# Patient Record
Sex: FEMALE | Race: WHITE | NOT HISPANIC OR LATINO | URBAN - METROPOLITAN AREA
[De-identification: names, ages, dates, MRNs, and addresses within clinical notes are randomized per-mention and may not be internally consistent; named-entity substitution may affect disease eponyms.]

---

## 2017-06-19 ENCOUNTER — COMMUNICATION - HEALTHEAST (OUTPATIENT)
Dept: SURGERY | Facility: CLINIC | Age: 47
End: 2017-06-19

## 2017-07-31 ENCOUNTER — AMBULATORY - HEALTHEAST (OUTPATIENT)
Dept: SURGERY | Facility: CLINIC | Age: 47
End: 2017-07-31

## 2017-07-31 ENCOUNTER — OFFICE VISIT - HEALTHEAST (OUTPATIENT)
Dept: SURGERY | Facility: CLINIC | Age: 47
End: 2017-07-31

## 2017-07-31 ENCOUNTER — COMMUNICATION - HEALTHEAST (OUTPATIENT)
Dept: SURGERY | Facility: CLINIC | Age: 47
End: 2017-07-31

## 2017-07-31 ENCOUNTER — AMBULATORY - HEALTHEAST (OUTPATIENT)
Dept: LAB | Facility: CLINIC | Age: 47
End: 2017-07-31

## 2017-07-31 DIAGNOSIS — E66.811 OBESITY (BMI 30.0-34.9): ICD-10-CM

## 2017-07-31 DIAGNOSIS — E78.5 DYSLIPIDEMIA: ICD-10-CM

## 2017-07-31 DIAGNOSIS — R53.83 FATIGUE: ICD-10-CM

## 2017-07-31 DIAGNOSIS — E53.8 VITAMIN B12 DEFICIENCY: ICD-10-CM

## 2017-07-31 DIAGNOSIS — R79.89 ELEVATED FERRITIN: ICD-10-CM

## 2017-07-31 DIAGNOSIS — N95.1 PERIMENOPAUSAL: ICD-10-CM

## 2017-07-31 DIAGNOSIS — R32 URINARY INCONTINENCE: ICD-10-CM

## 2017-07-31 LAB
FASTING STATUS PATIENT QL REPORTED: NO
HBA1C MFR BLD: 5.1 % (ref 4.2–6.1)
HDLC SERPL-MCNC: 59 MG/DL
LDLC SERPL CALC-MCNC: 168 MG/DL

## 2017-07-31 RX ORDER — NAPROXEN SODIUM 220 MG
220 TABLET ORAL PRN
Status: SHIPPED | COMMUNITY
Start: 2017-07-31

## 2017-07-31 ASSESSMENT — MIFFLIN-ST. JEOR: SCORE: 1438.23

## 2017-08-01 ENCOUNTER — AMBULATORY - HEALTHEAST (OUTPATIENT)
Dept: SURGERY | Facility: CLINIC | Age: 47
End: 2017-08-01

## 2017-08-01 DIAGNOSIS — R79.82 ELEVATED C-REACTIVE PROTEIN (CRP): ICD-10-CM

## 2017-09-27 ENCOUNTER — OFFICE VISIT - HEALTHEAST (OUTPATIENT)
Dept: SURGERY | Facility: CLINIC | Age: 47
End: 2017-09-27

## 2017-09-27 DIAGNOSIS — E66.9 OBESITY (BMI 30-39.9): ICD-10-CM

## 2017-09-27 DIAGNOSIS — Z71.3 DIETARY COUNSELING: ICD-10-CM

## 2017-09-27 ASSESSMENT — MIFFLIN-ST. JEOR: SCORE: 1404.21

## 2017-10-24 ENCOUNTER — OFFICE VISIT - HEALTHEAST (OUTPATIENT)
Dept: SURGERY | Facility: CLINIC | Age: 47
End: 2017-10-24

## 2017-10-24 DIAGNOSIS — E66.811 OBESITY (BMI 30.0-34.9): ICD-10-CM

## 2017-10-24 DIAGNOSIS — R79.82 ELEVATED C-REACTIVE PROTEIN (CRP): ICD-10-CM

## 2017-10-24 ASSESSMENT — MIFFLIN-ST. JEOR: SCORE: 1383.8

## 2017-11-27 ENCOUNTER — OFFICE VISIT - HEALTHEAST (OUTPATIENT)
Dept: SURGERY | Facility: CLINIC | Age: 47
End: 2017-11-27

## 2017-11-27 DIAGNOSIS — Z71.3 DIETARY COUNSELING: ICD-10-CM

## 2017-11-27 DIAGNOSIS — E66.9 OBESITY (BMI 30-39.9): ICD-10-CM

## 2017-11-27 ASSESSMENT — MIFFLIN-ST. JEOR: SCORE: 1356.58

## 2018-01-10 ENCOUNTER — COMMUNICATION - HEALTHEAST (OUTPATIENT)
Dept: SURGERY | Facility: CLINIC | Age: 48
End: 2018-01-10

## 2018-01-10 ENCOUNTER — OFFICE VISIT - HEALTHEAST (OUTPATIENT)
Dept: SURGERY | Facility: CLINIC | Age: 48
End: 2018-01-10

## 2018-01-10 DIAGNOSIS — E66.811 OBESITY (BMI 30.0-34.9): ICD-10-CM

## 2018-01-10 ASSESSMENT — MIFFLIN-ST. JEOR: SCORE: 1358.85

## 2018-01-11 ENCOUNTER — COMMUNICATION - HEALTHEAST (OUTPATIENT)
Dept: SURGERY | Facility: CLINIC | Age: 48
End: 2018-01-11

## 2018-03-05 ENCOUNTER — OFFICE VISIT - HEALTHEAST (OUTPATIENT)
Dept: SURGERY | Facility: CLINIC | Age: 48
End: 2018-03-05

## 2018-03-05 DIAGNOSIS — Z71.3 DIETARY COUNSELING: ICD-10-CM

## 2018-03-05 DIAGNOSIS — E66.9 OBESITY (BMI 30-39.9): ICD-10-CM

## 2018-03-05 ASSESSMENT — MIFFLIN-ST. JEOR: SCORE: 1354.32

## 2018-08-24 ENCOUNTER — COMMUNICATION - HEALTHEAST (OUTPATIENT)
Dept: SURGERY | Facility: CLINIC | Age: 48
End: 2018-08-24

## 2018-08-27 ENCOUNTER — COMMUNICATION - HEALTHEAST (OUTPATIENT)
Dept: SURGERY | Facility: CLINIC | Age: 48
End: 2018-08-27

## 2018-10-11 ENCOUNTER — OFFICE VISIT - HEALTHEAST (OUTPATIENT)
Dept: SURGERY | Facility: CLINIC | Age: 48
End: 2018-10-11

## 2018-10-11 DIAGNOSIS — R79.82 ELEVATED C-REACTIVE PROTEIN (CRP): ICD-10-CM

## 2018-10-11 DIAGNOSIS — R79.89 ELEVATED FERRITIN: ICD-10-CM

## 2018-10-11 DIAGNOSIS — E78.00 ELEVATED LDL CHOLESTEROL LEVEL: ICD-10-CM

## 2018-10-11 DIAGNOSIS — E66.9 OBESITY: ICD-10-CM

## 2018-10-11 DIAGNOSIS — Z13.1 SCREENING FOR DIABETES MELLITUS: ICD-10-CM

## 2018-10-11 ASSESSMENT — MIFFLIN-ST. JEOR: SCORE: 1386.07

## 2018-10-12 ENCOUNTER — COMMUNICATION - HEALTHEAST (OUTPATIENT)
Dept: LAB | Facility: CLINIC | Age: 48
End: 2018-10-12

## 2018-10-23 ENCOUNTER — AMBULATORY - HEALTHEAST (OUTPATIENT)
Dept: LAB | Facility: CLINIC | Age: 48
End: 2018-10-23

## 2018-10-23 DIAGNOSIS — Z13.1 SCREENING FOR DIABETES MELLITUS: ICD-10-CM

## 2018-10-23 DIAGNOSIS — R79.89 ELEVATED FERRITIN: ICD-10-CM

## 2018-10-23 DIAGNOSIS — E66.9 OBESITY: ICD-10-CM

## 2018-10-23 DIAGNOSIS — R79.82 ELEVATED C-REACTIVE PROTEIN (CRP): ICD-10-CM

## 2018-10-23 DIAGNOSIS — E78.00 ELEVATED LDL CHOLESTEROL LEVEL: ICD-10-CM

## 2018-10-23 LAB
ALBUMIN SERPL-MCNC: 3.7 G/DL (ref 3.5–5)
ALP SERPL-CCNC: 45 U/L (ref 45–120)
ALT SERPL W P-5'-P-CCNC: 17 U/L (ref 0–45)
ANION GAP SERPL CALCULATED.3IONS-SCNC: 11 MMOL/L (ref 5–18)
AST SERPL W P-5'-P-CCNC: 16 U/L (ref 0–40)
BILIRUB SERPL-MCNC: 0.7 MG/DL (ref 0–1)
BUN SERPL-MCNC: 18 MG/DL (ref 8–22)
CALCIUM SERPL-MCNC: 9.2 MG/DL (ref 8.5–10.5)
CHLORIDE BLD-SCNC: 108 MMOL/L (ref 98–107)
CHOLEST SERPL-MCNC: 228 MG/DL
CO2 SERPL-SCNC: 22 MMOL/L (ref 22–31)
CREAT SERPL-MCNC: 0.77 MG/DL (ref 0.6–1.1)
CRP SERPL HS-MCNC: 7.2 MG/L (ref 0–3)
ERYTHROCYTE [DISTWIDTH] IN BLOOD BY AUTOMATED COUNT: 11.6 % (ref 11–14.5)
FASTING STATUS PATIENT QL REPORTED: ABNORMAL
FERRITIN SERPL-MCNC: 268 NG/ML (ref 10–130)
GFR SERPL CREATININE-BSD FRML MDRD: >60 ML/MIN/1.73M2
GLUCOSE BLD-MCNC: 97 MG/DL (ref 70–125)
HBA1C MFR BLD: 5.2 % (ref 3.5–6)
HCT VFR BLD AUTO: 37.6 % (ref 35–47)
HDLC SERPL-MCNC: 61 MG/DL
HGB BLD-MCNC: 12.9 G/DL (ref 12–16)
IRON SATN MFR SERPL: 39 % (ref 20–50)
IRON SERPL-MCNC: 140 UG/DL (ref 42–175)
LDLC SERPL CALC-MCNC: 139 MG/DL
MCH RBC QN AUTO: 30.1 PG (ref 27–34)
MCHC RBC AUTO-ENTMCNC: 34.3 G/DL (ref 32–36)
MCV RBC AUTO: 88 FL (ref 80–100)
PLATELET # BLD AUTO: 262 THOU/UL (ref 140–440)
PMV BLD AUTO: 7.4 FL (ref 7–10)
POTASSIUM BLD-SCNC: 4.3 MMOL/L (ref 3.5–5)
PROT SERPL-MCNC: 6.4 G/DL (ref 6–8)
PTH-INTACT SERPL-MCNC: 62 PG/ML (ref 10–86)
RBC # BLD AUTO: 4.28 MILL/UL (ref 3.8–5.4)
SODIUM SERPL-SCNC: 141 MMOL/L (ref 136–145)
TIBC SERPL-MCNC: 362 UG/DL (ref 313–563)
TRANSFERRIN SERPL-MCNC: 290 MG/DL (ref 212–360)
TRIGL SERPL-MCNC: 138 MG/DL
WBC: 5.4 THOU/UL (ref 4–11)

## 2018-10-24 ENCOUNTER — COMMUNICATION - HEALTHEAST (OUTPATIENT)
Dept: SURGERY | Facility: CLINIC | Age: 48
End: 2018-10-24

## 2019-04-17 ENCOUNTER — OFFICE VISIT - HEALTHEAST (OUTPATIENT)
Dept: SURGERY | Facility: CLINIC | Age: 49
End: 2019-04-17

## 2019-04-17 DIAGNOSIS — E66.9 OBESITY: ICD-10-CM

## 2019-04-17 DIAGNOSIS — R63.2 HYPERPHAGIA: ICD-10-CM

## 2019-04-17 RX ORDER — NORETHINDRONE ACETATE AND ETHINYL ESTRADIOL .03; 1.5 MG/1; MG/1
1 TABLET ORAL DAILY
Status: SHIPPED | COMMUNITY
Start: 2010-10-07

## 2019-04-17 ASSESSMENT — MIFFLIN-ST. JEOR: SCORE: 1372.46

## 2019-08-26 ENCOUNTER — OFFICE VISIT - HEALTHEAST (OUTPATIENT)
Dept: SURGERY | Facility: CLINIC | Age: 49
End: 2019-08-26

## 2019-08-26 DIAGNOSIS — E66.9 OBESITY (BMI 30-39.9): ICD-10-CM

## 2019-08-26 DIAGNOSIS — Z71.3 DIETARY COUNSELING: ICD-10-CM

## 2019-08-26 ASSESSMENT — MIFFLIN-ST. JEOR: SCORE: 1386.07

## 2019-10-16 ENCOUNTER — OFFICE VISIT - HEALTHEAST (OUTPATIENT)
Dept: SURGERY | Facility: CLINIC | Age: 49
End: 2019-10-16

## 2019-10-16 DIAGNOSIS — E66.811 OBESITY (BMI 30.0-34.9): ICD-10-CM

## 2019-10-16 DIAGNOSIS — E66.9 OBESITY: ICD-10-CM

## 2019-10-16 RX ORDER — PHENTERMINE HYDROCHLORIDE 37.5 MG/1
TABLET ORAL
Qty: 90 TABLET | Refills: 1 | Status: SHIPPED | OUTPATIENT
Start: 2019-10-16

## 2019-10-16 ASSESSMENT — MIFFLIN-ST. JEOR: SCORE: 1408.75

## 2019-11-12 ENCOUNTER — OFFICE VISIT - HEALTHEAST (OUTPATIENT)
Dept: SURGERY | Facility: CLINIC | Age: 49
End: 2019-11-12

## 2019-11-12 DIAGNOSIS — Z71.3 NUTRITIONAL COUNSELING: ICD-10-CM

## 2019-11-12 DIAGNOSIS — E66.811 OBESITY, CLASS I, BMI 30.0-34.9 (SEE ACTUAL BMI): ICD-10-CM

## 2019-11-12 ASSESSMENT — MIFFLIN-ST. JEOR: SCORE: 1420.54

## 2021-03-24 ENCOUNTER — AMBULATORY - HEALTHEAST (OUTPATIENT)
Dept: NURSING | Facility: CLINIC | Age: 51
End: 2021-03-24

## 2021-04-14 ENCOUNTER — AMBULATORY - HEALTHEAST (OUTPATIENT)
Dept: NURSING | Facility: CLINIC | Age: 51
End: 2021-04-14

## 2021-05-27 NOTE — PROGRESS NOTES
Here for f/u MWM, needs a refill of her phentermine.  See flowsheet.     Clarita Gonzalez RN, [facility]  P [phone]  F [phone]

## 2021-05-27 NOTE — PATIENT INSTRUCTIONS - HE
[facility]ric Basics    Remember to:    -Eat 3 meals a day (not 2, not 5) Chew your food well/SLOW down  -Eat your protein first  -Be a water drinker/Minize liquid calories (no regular pop, no juice) skim or 1% milk OK  -Sleep 7-8 hours each night. Address sleep if problematic  -Stress management is important. Address if problematic  -Move-8000 steps daily Muscle: maintain your muscle mass (strength training 2X/wk)  -Wheat, not white (bread, pasta, crackers, roni, bagels, tortillas, rice)  -Limit restaurant, cafeteria, take out, drive through to 2 times per week or less  -Minimize caffeine, alcohol, and night-time snacking  -Consider keeping a food diary (i.e. My Fitness Pal, Lose It, or other food tracker)  -Follow up with the dietitian      **Some lean proteins: chicken, turkey, tuna, salmon, crab, fish, shrimp, scallops, lobster, lean cuts of beef and pork, luncheon meats, veggie burgers, beans (black, lima, garbanzo, boogie, kidney, refried), chile, cottage cheese, string cheese, other cheese, eggs, tofu, peanut butter, nuts, vegan crumbles, greek yogurt

## 2021-05-27 NOTE — PROGRESS NOTES
"Bariatric Follow-up    48 y.o.  female BMI:Body mass index is 31.18 kg/m .    Weight:   Wt Readings from Last 1 Encounters:   04/17/19 176 lb (79.8 kg)    pounds  Height: 5' 3\" (1.6 m) (4/17/2019  8:44 AM)  Initial Weight: 190.5 lbs (4/17/2019  8:44 AM)  Weight: 176 lb (79.8 kg) (4/17/2019  8:44 AM)  Weight loss from initial: 14.5 (4/17/2019  8:44 AM)  % Weight loss: 7.61 % (4/17/2019  8:44 AM)  BMI (Calculated): 31.2 (4/17/2019  8:44 AM)  SpO2: 100 % (10/11/2018  8:00 AM)      Comorbidities:  Patient Active Problem List   Diagnosis     Elevated LDL cholesterol level     Obesity (BMI 30.0-34.9)     Knee pain     Low back pain     Foot pain     Headache     Perimenopausal     Fatigue     Arthritis     Menstrual disorder     Urinary incontinence     Skin tags, multiple acquired     Vitamin B12 deficiency     Elevated ferritin level     Elevated C-reactive protein (CRP)     FHx: rheumatoid arthritis       Interim: Maintaining a 14# weight loss. BP, chol, sugars look good. Did the whole 30 diet    Plan: Refill phentermine. Dietitian prn protect sleep and stress management   -We reviewed her medications and whether associated with weight gain.    We discussed [facility] Bariatric Basics including:  -eating 3 meals daily  -eating protein first  -eating slowly, chewing food well  -avoiding/limiting calorie containing beverages  -choosing wheat, not white with breads, crackers, pastas, roni, bagels, tortillas, rice  -limiting restaurant or cafeteria eating to twice a week or less  -We discussed the importance of restorative sleep and stress management in maintaining a healthy weight.  -We discussed insulin resistance and glycemic index as it relates to appetite and weight control  -We discussed the National Weight Control Registry healthy weight maintenance strategies and ways to optimize metabolism.  -We discussed the importance of physical activity including cardiovascular and strength training in maintaining a healthier " weight and explored viable options.    Most recent labs:  Lab Results   Component Value Date    WBC 5.4 10/23/2018    HGB 12.9 10/23/2018    HCT 37.6 10/23/2018    MCV 88 10/23/2018     10/23/2018     Lab Results   Component Value Date    CHOL 228 (H) 10/23/2018     Lab Results   Component Value Date    HDL 61 10/23/2018     Lab Results   Component Value Date    LDLCALC 139 (H) 10/23/2018     Lab Results   Component Value Date    TRIG 138 10/23/2018     No components found for: CHOLHDL  Lab Results   Component Value Date    ALT 17 10/23/2018    AST 16 10/23/2018    ALKPHOS 45 10/23/2018    BILITOT 0.7 10/23/2018     Lab Results   Component Value Date    HGBA1C 5.2 10/23/2018     Lab Results   Component Value Date    BTYDYJRE26 174 (L) 07/31/2017     No results found for: FZYXPZXY54SE  Lab Results   Component Value Date    FERRITIN 268 (H) 10/23/2018     Lab Results   Component Value Date    PTH 62 10/23/2018     No results found for: 97250  No results found for: 7597  Lab Results   Component Value Date    TSH 1.06 07/31/2017     No results found for: TESTOSTERONE    DIETARY HISTORY  Meals Per Day: 3  Eating Protein First?: yes  Food Diary: B:egg, cheese, anaya or meat L:salad with protein, nourish bowls,  D:mushrooms and italian sausage and 3 pasta  Snacks Per Day: 0-1  Typical Snack: cheese, nuts, protein cracker  Fluid Intake: intentional  Portion Control: improved  Calorie Containing Beverages: no except a splash  Alcohol per week:  Less now maybe 1-2/wk  Typical Protein Food Choices: variety  Choosing Whole Grains: yes  Grocery Shopping is done by: herself  Food Preparation is done by: herself  Meals at Restaurant/Cafeteria/Take Out Per Week: 0-1  Eating at the Table: yes  TV is Off During Meals: yes    Positive Changes Since Last Visit: maintaining weight. Eating lean  Struggling With: exercise    Knowledgeable in Reading Food Labels: yes  Getting Adequate Protein: herson  Sleeping 7-8 hours/day awakening  "at 2-3am  Stress management high work stress    PHYSICAL ACTIVITY PATTERNS:  Cardiovascular: walks the dog 2X/d and swimming with Roxy  Strength Training: swimming    REVIEW OF SYSTEMS  GENERAL/CONSTITUTIONAL:  Fatigue: improved  CARDIOVASCULAR:  Chest Pain with Exertion: no  PULMONARY:  Dyspnea on exertion: no  CPAP Use: no  Tobacco Use: no  Asthma Controlled: NA  GASTROINTESTINAL:  GERD/Heartburn: no  Gallbladder:   UROLOGIC:  Urinary Symptoms: nocturia  NEUROLOGIC:  Headaches: yes, sleep and stress  Paresthesias: no  PSYCHIATRIC:  Moods: OK  MUSCULOSKELETAL/RHEUMATOLOGIC  Arthralgias: no  Myalgias: heel improved  ENDOCRINE:  Monitoring Blood Sugars: no  Sugars Well Controlled: yes  DERMATOLOGIC:  Rashes: no    PHYSICAL EXAM:  Vitals: /75   Pulse 72   Resp 16   Ht 5' 3\" (1.6 m)   Wt 176 lb (79.8 kg)   BMI 31.18 kg/m    Height: 5' 3\" (1.6 m) (4/17/2019  8:44 AM)  Initial Weight: 190.5 lbs (4/17/2019  8:44 AM)  Weight: 176 lb (79.8 kg) (4/17/2019  8:44 AM)  Weight loss from initial: 14.5 (4/17/2019  8:44 AM)  % Weight loss: 7.61 % (4/17/2019  8:44 AM)  BMI (Calculated): 31.2 (4/17/2019  8:44 AM)  SpO2: 100 % (10/11/2018  8:00 AM)      GEN: Pleasant, well groomed, in no acute distress  EYES: EOMI,  ENT: airway patent  NECK: no carotid bruits, no anterior/supraclavicular lymphadenopathy, thyroid normal   HEART: Rhythm regular, rate regular, no murmur   LUNGS: Clear  ABDOMEN: soft, non-tender, obese, no rashes   VASCULAR: no  lower extremity edema  MUSCULOSKELETAL:  muscle mass OK for age  SKIN:  no color changes of venous stasis, no ulcerations    Time spent with patients 30 minutes, >50% in counseling and coordination of care.        "

## 2021-05-31 VITALS — HEIGHT: 63 IN | WEIGHT: 190.5 LBS | BODY MASS INDEX: 33.75 KG/M2

## 2021-05-31 VITALS — HEIGHT: 63 IN | WEIGHT: 172.5 LBS | BODY MASS INDEX: 30.56 KG/M2

## 2021-05-31 VITALS — WEIGHT: 178.5 LBS | HEIGHT: 63 IN | BODY MASS INDEX: 31.63 KG/M2

## 2021-05-31 VITALS — WEIGHT: 173 LBS | BODY MASS INDEX: 30.65 KG/M2 | HEIGHT: 63 IN

## 2021-05-31 VITALS — HEIGHT: 63 IN | BODY MASS INDEX: 32.43 KG/M2 | WEIGHT: 183 LBS

## 2021-05-31 NOTE — PROGRESS NOTES
Medical  Weight Loss Follow-Up Diet Evaluation  Assessment:  Gunjan is presenting today for a follow up weight management nutrition consultation. Pt has had an initial appointment with Dr. Gee  Weight loss medication: Phentermine. 1 full tab daily  Pt's Initial Weight: 190.5 lbs  Weight: 179 lb (81.2 kg)  Weight loss from initial: 11.5  % Weight loss: 6.04 %    BMI: Body mass index is 31.71 kg/m .  IBW: 115 lbs    Estimated RMR ([place]-St Jeor equation): 1462   Recommended Protein Intake:  grams of protein/day  Patient Active Problem List:  Patient Active Problem List   Diagnosis     Elevated LDL cholesterol level     Obesity (BMI 30.0-34.9)     Knee pain     Low back pain     Foot pain     Headache     Perimenopausal     Fatigue     Arthritis     Menstrual disorder     Urinary incontinence     Skin tags, multiple acquired     Vitamin B12 deficiency     Elevated ferritin level     Elevated C-reactive protein (CRP)     FHx: rheumatoid arthritis     Progress on goals from last visit: Pt has not been in for over a year; pt would like to get back on track. She stated over the summer she wasn't paying as close of attention. She did complete the whole 30 - answered questions throughout. Pt wanted to talk about balanced nutrition for her daughter and healthy snacking. She is starting a running group with her daughter and plans to run a 5k at the end of the program.  ++pt has successfully eliminated diet coke and state cravings are lower now that she is not drinking    Nutrition Diagnosis:    (NI-1.3)Excessive energy intake related to Not ready for diet/lifestyle change as evidenced by Intake of high caloric density foods at meals and/or snacks; large portion; frequent grazing; Estimated intake that exceeds estimated daily energy intake; Binge eating patterns; Frequent  fast food or restaurant intake; and BMI 31.71    (NC-3.3.5) Obese, BMI ?30 related to physical inactivity as evidenced by Infrequent, low-duration  and or low intensity physical activity; and Large amounts of sedentary activities; no structured physical activity regimen      Intervention:  1. Recommend calorie/nutrient modification    Implementation:  1. Reviewed progress with previous goals  2. Reviewed meal planning  3. Reviewed protein sources and building a balanced plate  ++meals and snack idea ([place]) given    Monitoring/Evaluation:    Goals:  1. Pt would like to start the 5k program and strength training programs given to her  Follow up:  Pt will follow up in 2 month(s) with bariatrician  Time spent with patient: 30 minutes  Janny Solomon RD     ABN signed: Yes

## 2021-06-01 VITALS — HEIGHT: 63 IN | WEIGHT: 172 LBS | BODY MASS INDEX: 30.48 KG/M2

## 2021-06-02 VITALS — HEIGHT: 63 IN | BODY MASS INDEX: 31.71 KG/M2 | WEIGHT: 179 LBS

## 2021-06-02 NOTE — PROGRESS NOTES
"Bariatric Follow-up    49 y.o.  female BMI:Body mass index is 32.59 kg/m .    Weight:   Wt Readings from Last 1 Encounters:   10/16/19 184 lb (83.5 kg)    pounds  Height: 5' 3\" (1.6 m) (10/16/2019  7:54 AM)  Initial Weight: 190.5 lbs (8/26/2019  8:00 AM)  Weight: 184 lb (83.5 kg) (10/16/2019  7:54 AM)  Weight loss from initial: 11.5 (8/26/2019  8:00 AM)  % Weight loss: 6.04 % (8/26/2019  8:00 AM)  BMI (Calculated): 32.6 (10/16/2019  7:54 AM)  SpO2: 100 % (10/16/2019  7:54 AM)      Comorbidities:  Patient Active Problem List   Diagnosis     Elevated LDL cholesterol level     Obesity (BMI 30.0-34.9)     Knee pain     Low back pain     Foot pain     Headache     Perimenopausal     Fatigue     Arthritis     Menstrual disorder     Urinary incontinence     Skin tags, multiple acquired     Vitamin B12 deficiency     Elevated ferritin level     Elevated C-reactive protein (CRP)     FHx: rheumatoid arthritis     Interim: Traveling a lot. Saw [place] in August    Plan: consistent strength training, refill phentermine. Joanna.  -We reviewed her medications and whether associated with weight gain.    We discussed [facility] Bariatric Basics including:  -eating 3 meals daily  -eating protein first  -eating slowly, chewing food well  -avoiding/limiting calorie containing beverages  -choosing wheat, not white with breads, crackers, pastas, roni, bagels, tortillas, rice  -limiting restaurant or cafeteria eating to twice a week or less  -We discussed the importance of restorative sleep and stress management in maintaining a healthy weight.  -We discussed insulin resistance and glycemic index as it relates to appetite and weight control  -We discussed the National Weight Control Registry healthy weight maintenance strategies and ways to optimize metabolism.  -We discussed the importance of physical activity including cardiovascular and strength training in maintaining a healthier weight and explored viable options.    Most recent " "labs:  Lab Results   Component Value Date    WBC 5.4 10/23/2018    HGB 12.9 10/23/2018    HCT 37.6 10/23/2018    MCV 88 10/23/2018     10/23/2018     Lab Results   Component Value Date    CHOL 228 (H) 10/23/2018     Lab Results   Component Value Date    HDL 61 10/23/2018     Lab Results   Component Value Date    LDLCALC 139 (H) 10/23/2018     Lab Results   Component Value Date    TRIG 138 10/23/2018     Lab Results   Component Value Date    ALT 17 10/23/2018    AST 16 10/23/2018    ALKPHOS 45 10/23/2018    BILITOT 0.7 10/23/2018     Lab Results   Component Value Date    HGBA1C 5.2 10/23/2018     Lab Results   Component Value Date    IQUMSHBB52 174 (L) 07/31/2017     Lab Results   Component Value Date    FERRITIN 268 (H) 10/23/2018     Lab Results   Component Value Date    PTH 62 10/23/2018     Lab Results   Component Value Date    TSH 1.06 07/31/2017     DIETARY HISTORY  Meals Per Day: 3  Eating Protein First?: yes  Food Diary: B:egg or  L:salad with protein D:chile  Snacks Per Day: 0-1  Typical Snack: almonds  Fluid Intake: intentional  Portion Control: improved  Calorie Containing Beverages: no  Alcohol per week: 0-2  Typical Protein Food Choices: variety  Choosing Whole Grains: yes  Grocery Shopping is done by: herself  Food Preparation is done by: herself  Meals at Restaurant/Cafeteria/Take Out Per Week: 1-2  Eating at the Table: yes  TV is Off During Meals: yes    Positive Changes Since Last Visit: maintaining a 6# weight loss and metabolically healthy  Struggling With: work is stressful-daily calm asha used to help    Knowledgeable in Reading Food Labels: yes  Getting Adequate Protein: yes  Sleeping 7-8 hours/day wakes up at 3 am.  Stress management will get her calm asha    PHYSICAL ACTIVITY PATTERNS:  Cardiovascular: walks the dog 2-3X/day. Signed up to  \"girls on the run\"  Strength Training: push ups with girls on the run, was swimming,  Will get back to yoga, and walking the dog    REVIEW OF " "SYSTEMS  GENERAL/CONSTITUTIONAL:  Fatigue: on and off  CARDIOVASCULAR:  Chest Pain with Exertion: yes  PULMONARY:  Dyspnea on exertion: no  CPAP Use: no  Tobacco Use: no  Asthma Controlled: NA  GASTROINTESTINAL:  GERD/Heartburn: no  Gallbladder:   UROLOGIC:  Urinary Symptoms: no  NEUROLOGIC:  Headaches: some  Paresthesias: no  PSYCHIATRIC:  Moods: OK  MUSCULOSKELETAL/RHEUMATOLOGIC  Arthralgias: improved  Myalgias: improved  ENDOCRINE:  Monitoring Blood Sugars: no  Sugars Well Controlled: yes  DERMATOLOGIC:  Rashes: no    PHYSICAL EXAM:  Vitals: /74 (Patient Site: Right Arm, Patient Position: Sitting, Cuff Size: Adult Large)   Pulse 85   Ht 5' 3\" (1.6 m)   Wt 184 lb (83.5 kg)   SpO2 100%   Breastfeeding? No   BMI 32.59 kg/m    Height: 5' 3\" (1.6 m) (10/16/2019  7:54 AM)  Initial Weight: 190.5 lbs (8/26/2019  8:00 AM)  Weight: 184 lb (83.5 kg) (10/16/2019  7:54 AM)  Weight loss from initial: 11.5 (8/26/2019  8:00 AM)  % Weight loss: 6.04 % (8/26/2019  8:00 AM)  BMI (Calculated): 32.6 (10/16/2019  7:54 AM)  SpO2: 100 % (10/16/2019  7:54 AM)      GEN: Pleasant, well groomed, in no acute distress  EYES: EOMI,  ENT: airway patent  NECK: no carotid bruits, no anterior/supraclavicular lymphadenopathy, thyroid normal   HEART: Rhythm regular, rate regular, no murmur   LUNGS: Clear  ABDOMEN: soft, non-tender, obese, no rashes   VASCULAR: no  lower extremity edema  MUSCULOSKELETAL:  muscle mass OK  SKIN:  no color changes of venous stasis, no ulcerations    Time spent with patients 20 minutes, >50% in counseling and coordination of care.        "

## 2021-06-02 NOTE — PATIENT INSTRUCTIONS - HE
"WEIGHT MAINTENANCE STRATEGIES    According to the National Weight Control Registry there are several things that people who have lost weight and kept it off have in common. Some of them are...    1. 3 MEALS A DAY:  Make sure you are eating 3 meals each day.  No skipping meals.  80% of people who skip meals are overweight or obese.  Missing meals slows the metabolism, making it harder to maintain a healthy weight.    2. FOOD DIARY:  Much like keeping a ledger for your checkbook, keeping a food and exercise diary helps you \"keep track\" of the balance of energy (calories) in and energy out. It also helps you recognize potential unhealthy deviations from healthy patterns before they become habit. It's a nice way to monitor whether you are getting the protein, fiber, and other nutrients that your body needs.    3. FOLLOW-UP:  Studies show that those who follow up with their health professional regularly maintained their weight loss and those who are \"lost to follow-up \"are at risk for regain.  Moreover, it is essential to monitor vitamin levels with laboratory studies for life following gastric bypass surgery.     4.  HIGH FIBER/LOW FAT:  Lean sources of protein (skim milk, skinless, baked or broiled chicken breast, fish, etc.)  will help you meet your protein needs while fruits, vegetables, and whole grains will help you get the fiber that your body needs.  This is heart healthy eating and helps to keep calorie levels in balance.    5.  8,000 STEPS PER DAY:  This is a \"weight maintenance dose. \"  It is essential to get your steps in every day, 7 days a week.  You don't have to \"work out \" 7 days a week, but throughout the day, getting 8000 steps will help you maintain the weight you have lost.  Parking far away, taking the stairs instead of the elevator, and pacing while on the phone are some ways to help achieve this goal.    6.  Eat at home 90% OF THE TIME:  People who maintain a healthy weight eat at home, or meal " "prepared at home, 90% of the time.  Studies show that people consume an average of 770 bacilio when eating out at a restaurant and 440 bacilio when eating a meal prepared at home.  This equates to almost 35 pounds of excess weight for a person who eats out once a day for 1 year.      OTHER HELPFUL HABITS    -Minimize liquid calories.  Skim milk is okay.  -Avoiding \"mindless \"eating, i.e., eating at the TV, and the car, in front of the computer.  -Protect your sleep and Manage your stress        OPTIMIZING YOUR METABOLISM FOR LIFE    1.  MUSCLE MAINTENANCE: Muscle burns calories up to 70% better than fat test.  As we age her body composition changes. We lose muscle mass.  Weight training can help us keep and even build muscle mass.  Dumbbells, pushups, rubber band training, weight machines are all examples of ways to keep and/or build muscle mass.    2.  MOVE: 8000 steps daily has been shown to be a weight maintenance dose.  Aim for 10,000 steps each day. Helpfull habits include taking the stairs instead of the elevator when possible, parking at the far end of the parking lot, pacing while on the phone, and taking the dog for a walk.  Of course using a treadmill, stair climber, elliptical , and bicycle are all ways of getting 10,000 steps.    3.  3 MEALS EACH DAY: Make sure to get your 3 meals each day.  Skipping breakfast, working through your lunch, or not eating dinner will lead to a slowing of your metabolism.  Studies show that 80% of people who skip meals are overweight or obese.    4.  ADEQUATE PROTEIN INTAKE: Getting adequate protein is beneficial for a number of reasons: to aid the healing process, to blunt cravings immediately after eating and for a period of time after eating, to help keep blood sugars level, and to help you maintain your muscle mass.  See #1 above.  "

## 2021-06-03 VITALS
SYSTOLIC BLOOD PRESSURE: 114 MMHG | OXYGEN SATURATION: 100 % | WEIGHT: 184 LBS | DIASTOLIC BLOOD PRESSURE: 74 MMHG | BODY MASS INDEX: 32.6 KG/M2 | HEIGHT: 63 IN | HEART RATE: 85 BPM

## 2021-06-03 VITALS — BODY MASS INDEX: 31.18 KG/M2 | HEIGHT: 63 IN | WEIGHT: 176 LBS

## 2021-06-03 VITALS — HEIGHT: 63 IN | BODY MASS INDEX: 33.06 KG/M2 | WEIGHT: 186.6 LBS

## 2021-06-03 VITALS — HEIGHT: 63 IN | BODY MASS INDEX: 31.71 KG/M2 | WEIGHT: 179 LBS

## 2021-06-03 NOTE — PROGRESS NOTES
Medical  Weight Loss Follow-Up Diet Evaluation  Assessment:  Gunjan is presenting today for a follow up weight management nutrition consultation. Pt has had an initial appointment with Dr. Gee.  Weight loss medication: Phentermine.   Just did 5k on Sunday. Goal weight is around 160lb- felt good here, would like to feel good and active and keep up with her 9 year old  Pt's Initial Weight: 190.5 lbs  Weight: 186 lb 9.6 oz (84.6 kg)  Weight loss from initial: 3.9  % Weight loss: 2.05 %    BMI: Body mass index is 33.05 kg/m .  IBW: 115-125 lbs    Estimated RMR ([place]-St Jeor equation): 1435kcal   Recommended Protein Intake: 60-80 grams of protein/day  Patient Active Problem List:  Patient Active Problem List   Diagnosis     Elevated LDL cholesterol level     Obesity (BMI 30.0-34.9)     Knee pain     Low back pain     Foot pain     Headache     Perimenopausal     Fatigue     Arthritis     Menstrual disorder     Urinary incontinence     Skin tags, multiple acquired     Vitamin B12 deficiency     Elevated ferritin level     Elevated C-reactive protein (CRP)     FHx: rheumatoid arthritis     Diabetes: No    Progress on goals from last visit:   1. Run 5k- goal met, completed this past Sunday    Dietary Recall:  Breakfast: add an egg cup   Snack: beef snack bites  Lunch: sloppy christiano meat with a piece of sourdough bread  Snack: small candy bar  Dinner: chicken wings and 1/2 burger ana rosa  Snack: none  Overnight eating: No  Eating out (frequency/week): working on bringing lunch to work- 1-2 times per week   Hydration (type/oz. per day):  Water: 32oz-44oz  Caffeine: iced tea unsweetened, hot tea  Carbonation: sparkling water occasionally  Exercise:  Routine exercise established: Yes  Walk dog twice daily- girls on the run twice weekly- ends today, so is looking for something for the winter     Nutrition Diagnosis:    Overweight/Obesity (NC 3.3) related to overeating and poor lifestyle habits as evidenced by inadequate vegetable  intake, physical inactivity and BMI 33.05  Intervention:  1. Food and/or nutrient delivery: encouraged patient to increase her vegetable intake, aiming for at least one serving per day  2. Nutrition education: reviewed the plate method  3. Nutrition counseling: motivational interviewing and support for continued success    Monitoring/Evaluation:    Goals:  1. Work on weight maintenance over the holidays  2. 64oz water per day  3. Plan and prepare for the week and when traveling    Follow up:  Pt will follow up in 3 month(s) with dietitian.     Time spent with patient: 30 minutes  Joanna Del Castillo RD     ABN signed: Yes

## 2021-06-12 NOTE — PROGRESS NOTES
BARIATRIC CONSULTATION    Impression: Gunjan Fong is a 47 y.o. year old female with  has a past medical history of Arthritis; Dyslipidemia; Fatigue; Foot pain; Headache; Knee pain; Low back pain; Menstrual disorder; Obesity (BMI 30.0-34.9); Perimenopausal; Skin tags, multiple acquired; and Urinary incontinence.  Poor functional capacity and musculoskeletal disability in the setting of the abovementioned weight related co-morbidities. Her Body mass index is 33.75 kg/(m^2)..    Plan:  We discussed [facility] Basics including:  -eating 3 meals daily  -eating protein first  -eating slowly, chewing food well  -avoiding/limiting calorie containing beverages  -choosing wheat, not white with breads, crackers, pastas, roni, bagels, tortillas, rice  -limiting restaurant or cafeteria eating to twice a week or less    We discussed the importance of restorative sleep and stress management in maintaining a healthy weight.    We reviewed medications associated with weight gain.    We discussed insulin resistance and glycemic index as it relates to appetite and weight control.     We discussed the National Weight Control Registry healthy weight maintenance strategies and ways to optimize metabolism.  We discussed the importance of physical activity including cardiovascular and strength training in maintaining a healthier weight and explored viable options.    We discussed medications available for weight loss including Phentermine, Phendimetrazine, Topamax, Qsymia, Lorcaserin, Diethylproprion, Orlistat, Contrave, Saxenda, and Vyvanse. We discussed the risks and benefits of each. We discussed indications, contraindications, potential side effects, and estimated costs of each. Literature was offered.  60 minutes spent with patient. >50% in counseling.    Recommendations: Labs today. Dietitian.   Labs: ordered  Referrals: dietitian      History Surrounding Consultation  Struggles with weight started at age youth was 120#  "in 4th grade  Her weight at age 16 was 150# (had weight loss in [employer])  She has had several past supervised and unsupervised weight loss attempts  The most weight lost was: 50# with [employer]  Unfortunately there was not durable weight maintenance.  History of bulimia, anorexia, or binge eating disorder? no  If Present has eating disorder been in remission at least 3 years? NA  Night time eating? 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 She does  Who does the cooking? She does  A typical meal includes: steaks and salmon on the grill, green beans, cucumber salad, hash browns  Regular Pop: none  Juice: none  Caffeine: black 10oz in am and Starbucks or ice coffee  Amount of restaurant eating per week: trying to bring her lunch. 5/wk  Eating a the table with the TV off? She does, her daughter eats at the TV    Physical Activity Patterns  Current physical activity routine includes: walks the dog 2X/day 15 min to 30 min, did a 5K run/walk, has done [employer] videos/    Limitations from being physically active on a regular basis includes: nothing    She describes her general health as: good    Past Medical History  HTN: no  Dyslipidemia: yes  SILVESTRE: no  Obesity Hypoventilation: NO  DM2: no DM1: no DX: NA Most recent AIC: NA  Neuropathy: no  Nephropathy: no  Retinopathy: no  IFG or \"pre-DM\": no  MI: no  CVA:no  CHF: no  Heart Valves: no  Previous cardiac testing includes: no  Cancers: no  Kidney Disease: no  DVT: no  PE: no  Colitis: no  Crohn's: no  IBS: no  PUD: no  Fatty Liver: no  Abnormal LFTs: no  Hepatitis: no  Asthma: no  Bronchitis: no  Pneumonia: no  Other Lung Problems: no  Back Pain:yes  DDD: no  Gout: no  Fibromyalgia: no  USI: yes  Severe Headaches: tension  Seizures: no If so, last seizure: NA  Pseudotumor: no  PCOS: not diagnosed  Menstrual Irregularity: yes  Menorrhagia: " yes  Infertility: no  Thyroid problems: no  Thyroid medications: no  Glaucoma: no  HIV positive: NO  MRSA/VRE history: no  History of Blood transfusion: no  Anemia: no    Health Care Maintenance  Colonoscopy: not yet  Mammogram: UTD  Pap: UTD normal    Medications   Current Outpatient Prescriptions   Medication Sig Dispense Refill     cholecalciferol, vitamin D3, 10,000 unit Tab Take 1 tablet by mouth once a week.       Lactobacillus acidophilus (PROBIOTIC) 10 billion cell capsule Take 1 capsule by mouth daily.       naproxen sodium (ALEVE) 220 MG tablet Take 220 mg by mouth as needed for pain.       NON FORMULARY Take 3 tablets by mouth daily. Zarajorge albertoe's Naturals: Elderberry(135mg) Immune Support: Vit C(187.5mg), A(1500IU), D(300IU), E(15IU) and Zinc(2.3mg)       NON FORMULARY 1 packet 2 (two) times a day. Idealboost (craving control): Vit B3(14mg), B6(10mg), B12(30mcg)       norethindrone-ethinyl estradiol (MICROGESTIN /20) 1-20 mg-mcg per tablet Take 1 tablet by mouth daily.  2     No current facility-administered medications for this visit.      Allergies   Review of patient's allergies indicates no known allergies.  Past Surgical History  Past Surgical History:   Procedure Laterality Date     WISDOM TOOTH EXTRACTION       History of problems with anesthesia: no  History of Malignant Hyperthermia: NO    Gynecological History  Menarche: 10  Regular: yes  Currently: irreg/heavy perimenopausal  Problems getting pregnant: no  MD Involvement: NA If so, explanation/Diagnosis: NA  : 2  Para: 1011  C-S: 0  Vaginal deliveries: 1  SAB:1  EAB: 0  Gestational DM: no  Gestational HTN: no  Preeclampsia: no  Current Birth Control: OCPs    Family History  family history includes Arthritis in her father; Cancer in her paternal uncle; Diabetes in her maternal uncle; Hyperlipidemia in her father, maternal uncle, mother, and sister; Hypertension in her father, maternal uncle, and mother; Obesity in her maternal uncle and  "mother.    Social History  Status: M  Children: one daughter 7 yo  Work Status: FT      Addiction History  Smoking History:   Started smoking: NA Quit smoking: NA Total years of tobacco use: 0  Alcohol use: 2/wk wine  Current or Past history of alcohol or substance abuse: no  Last used: no  Chemical Dependency Treatment History: NA  Chemicals: NA    Psychiatric History  Diagnoses: anxiety  Treated by: primary MD with propranolol for work and something ?citalopram  Psychiatric Hospitalizations: No  Suicide attempts: No  ECT: no  Panic attacks: no  History of Abuse: no    Palliative Medicine History  Involvement in a pain clinic: no    ROS  Sleep  Snoring: with weight gain  PND: no  Witnessed Apneas: no  [place]: 15  STOP BAN8  General  Fatigue: yes  Sleep Quality:7 hours/interrupted ?perimenopause  HEENT  Visual changes: no  Gastrointestinal  Heartburn: no  Dysphagia: no  Cardiovascular  Murmur: no  Elevated BP: no  Chest Pain with Exertion: no  Dyspnea with Exertion: yes  Palpitations: no  Lower Extremity Edema: yes with heat and humidity  Syncope: no  Pulmonary  Shortness of breath at rest: no  Snoring: yes with weight gain  PND: no  Wheezing: no  CPAP use: NA  Gastrointestinal  Trouble swallowing:no  Heartburn: no  HX UGI/EGD: no  Abdominal pain: no  Hematochezia: no  Urologic  Hesitancy: no  Urgency: no  Genitourinary  ED: NA  Menorrhagia: yes  Dysmenorrhea: no  Neurologic  Severe headache:yes  Paresthesias: no  Psychiatric  Moods Stable: no/yes/perimenopausal  Hallucinations: no  Rheumatologic  Myalgias: yes  Arthralgias: yes  Endocrine  Polydipsia: no  Polyuria: no  Galactorrhea: no  Heat intolerance: yes  Hirsutism: no  Musculoskeletal  Joint pain;yes  Falls: no  Use of cane, crutch or motorized scooter: no  Hematologic  Abnormal Bleeding or Clotting: no  Dermatologic  Skin Tags: yes  Striae: yes  Furuncless: no  Acne: no  Intertrigo: no  Lower Leg ulcers: no      Physical Exam  Height: 5' 3\" (1.6 m) " "(7/31/2017  8:59 AM)  Initial Weight: 190.5 lbs (7/31/2017  8:59 AM)  Weight: 190 lb 8 oz (86.4 kg) (7/31/2017  8:59 AM)  Weight loss from initial: 0 (7/31/2017  8:59 AM)  % Weight loss: 0 % (7/31/2017  8:59 AM)  BMI (Calculated): 33.8 (7/31/2017  8:59 AM)  SpO2: 100 % (7/31/2017  8:59 AM)  Waist Circumference (In): 41.5 Inches (7/31/2017  8:59 AM)  Hip Circumference (In): 46.5 Inches (7/31/2017  8:59 AM)  Neck Circumference (In): 14 Inches (7/31/2017  8:59 AM)  Body fat percentage: 44.8 (7/31/2017  8:59 AM)   Vitamin D level 33.5 11/21/2016    General Appearance  No acute distress. Obesity: central  Alert: yes  Sleepy: no  HEENT  PERRLA, EOMI  Neck  Stout: 14\" No carotid bruits  Airway: 2+  Cardiovascular  Rhythm regular Rate Regular  Murmur: no  Pulmonary  [place] Score: 15  Lungs clear to ascultation  Abdomen  No rashes.   Post surgical Scars: no  Extremities:  Pitting edema: no  Palpable distal pulses: 2+  Varicose veins: no  Neurologic  Tremors: no  Psychiatric  Thought Content Organized  Mood appears stable  Endocrine  Moon Facies: NO  Dorsal Thoracic Prominence: NO  Skin tags: no  Acanthosis nigricans: no  Dermatologic  Intertrigo: no    Total time with patient 60 minutes, >50% in counseling and coordination of care.        "

## 2021-06-13 NOTE — PROGRESS NOTES
Here for non-surgical f/u visit.  Saw the dietitian and is taking phentermine 1 tab daily.  See flowsheet.    Clarita Gonzalez RN, N  [facility]  P [phone]  F [phone]

## 2021-06-13 NOTE — PROGRESS NOTES
Non-surgical Weight Loss Initial Diet Evaluation     Assessment:  Pt is a 47 y.o. female being seen today for non-surgical RD nutritional evaluation. Today we reviewed current eating habits and level of physical activity, and instructed on the changes that are required for successful weight loss outcomes.    -stress in job, seven year old   Personal Goals: lose weight and increase energy; knees pain; decrease cholesterol. She would also like to set a good example for her daughter  -pt has more energy and taking b12 currently     Phentermine: 1tab/day     Pt's Initial Weight: 190.5 lbs  Weight: 183 lb (83 kg)  Weight loss from initial: 7.5  % Weight loss: 3.94 %  BMI: Body mass index is 32.42 kg/(m^2).  IBW: 115 lbs    Estimated RMR (Derby-St Jeor equation): 1462 calories  Protein requirements (.5grams to .9grams per pound IBW, 20-30% of calories, minimum of 60-80gm per day):   grams     Food allergies, intolerances, Yazidism customs: none - does not eat onions and fish     Vitamins/Mineral Supplementation: B12  -lots of yo-yo dieting in the past    Biggest struggle with weight loss: emotion eater and sticking to changes     Who does the grocery shopping for your household? Self  Who prepares your meals at home? 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  Diet Recall/Time: wakes at 515am   Breakfast: 2 eggs, toast OR 1 egg and cheese OR 2 waffles   Am Snack: none lately   Lunch: Salad w/ protein, CHO  Pm snack:Fruit OR string cheese OR quinoa chips   Dinner: Pro/Veg/CHO  HS Snack: tries not to - but will depend on when she has dinner (ice cream)  -previously would have CHO in the AM and snacking throughout the day   Typical Snacks: above     Fats used at home: olive oil     Meals per week away from home: 5X/week for lunch   Starbucks - trying to reduce  Ice Cream/FrozenYogurt/Bakery: sometimes daily with her job    Recommended limiting eating out to no more  than 2x/week.  Patient and I reviewed the importance of eating three consistent meals per day; as well as meal timing to be spaced 4-5 hours apart.  Snack choices: 100-150 calories (1-2x/day if physically hungry), incorporating a fruit/vegetable w/ protein source.    Portion Sizes problematic? yes per patient/diet recall  Encouraged slowing meal times down, 20-30 minutes, chewing to applesauce consistency.   To aid in proper portion control and slow meal time down discussed consuming meals off smaller plates, use toddler/children utensils and set utensils down after each bite.    Protein, vegetables/fruits, carbohydrates:   Reviewed lean protein sources today. Recommended consuming 20-25gm protein at 3 meals daily.  The patient and I discussed the importance of including lean/low fat protein at each meal and limiting carbohydrate intake to less than 25% of plate volume.     Beverages (Type/Oz. per day)  Water: 60oz  Coffee: 1 cup/day  Tea: 1 glass/day   Milk: none  Regular soda: none  Diet soda: trying to reduce - only when out with fountain pop with ice  Juice: none  Vivek-Aid/lemonade/etc: none  Alcohol: wine - only on social events     Discussed the importance of adequate hydration and the goal of 64+ oz of fluid daily.   The patient understands the importance of avoiding all alcoholic and sweetened drinks, and instead choosing 64 oz plain water.    Exercise  Walking 3X/day   Signed up to be      Pt's understands that 45-60 minutes of daily activity is an important part of weight loss success.   Encouraged pt to incorporate upper body strength training exercise, even if its lifting soup cans while watching tv at night, doing push ups/sit-ups, and abdominal work.    PES statement:    1. (NI-1.3)Excessive energy intake related to Food and nutrition related knowledge deficit concerning excessive energy/oral intake as evidenced by Intake of high caloric density foods at meals and/or snacks; large portions;  frequent grazing; Estimated intake that exceeds estimated daily energy intake; Frequent excessive fast food or restaurant intake; and BMI 32.42    2. (NC-3.3.5) Obese, BMI ?30 related to physical inactivity as evidenced by Infrequent, low-duration and or low intensity physical activity; and Large amounts of sedentary activities; no structured physical activity regimen     Intervention  Discussion:  1. Educated pt on Eat Better, Move More, Live Well: Non-surgical Weight Loss Handout  2. Recommended to consume 20-25gm protein at 3 meals daily.  grams daily total.  3. Educated pt on food labels: keeping total fat grams <10, total sugar grams <10, fiber >3gm per serving.     Instructions/Goals:   1. Include 20-25gm protein at each meal.  2. Increase vegetable/fruit intake, by having a vegetable or fruit with each meal daily. Recommended pt to increase vegetable/fruit intake to 4-5 servings daily.  3. Increase fluid intake to 64oz daily: choose plain or calorie/alcohol-free beverages.  4. Incorporate daily structured activity, 45-60 minutes most days of the week  5. Read food labels more consistently: keeping total fat grams <10, total sugar grams <10, fiber >3gm per serving.  6. Practice plate method: 1/2 plate lean/low fat protein source, vegetable/fruit, <25% of plate complex carbohydrates.  7. Practice eating off of smaller plates/bowls, chewing to applesauce consistency, taking 20-30 minutes to eat in a calm/relaxed environment without distractions of tv/email/cell phone.    Handouts Provided:  Eat Better, Move More, Live Well: Non-surgical Weight Loss Handout  Protein Supplement List    Monitor/Evaluation:    Pt will f/u in one month with bariatrician, and f/u in two months with RD.    Plan for next visit with RD:  GOALS:  1) drinking 60oz consistently   2) introduce strength training 1-X/week   3) start counting protein and see where she is at protein wise     Time In: 8:30a  Time Out: 9:30a    ABN signed:  Yes

## 2021-06-13 NOTE — PROGRESS NOTES
"Bariatric Follow-up    47 y.o.  female BMI:Body mass index is 31.62 kg/(m^2).    Weight:   Wt Readings from Last 1 Encounters:   10/24/17 178 lb 8 oz (81 kg)    pounds  Height: 5' 3\" (1.6 m) (10/24/2017  8:02 AM)  Initial Weight: 190.5 lbs (10/24/2017  8:02 AM)  Weight: 178 lb 8 oz (81 kg) (10/24/2017  8:02 AM)  Weight loss from initial: 12 (10/24/2017  8:02 AM)  % Weight loss: 6.3 % (10/24/2017  8:02 AM)  BMI (Calculated): 31.6 (10/24/2017  8:02 AM)  SpO2: 100 % (7/31/2017  8:59 AM)  Waist Circumference (In): 41.5 Inches (7/31/2017  8:59 AM)  Hip Circumference (In): 46.5 Inches (7/31/2017  8:59 AM)  Neck Circumference (In): 14 Inches (7/31/2017  8:59 AM)  Body fat percentage: 42.4 (10/24/2017  8:02 AM)    Comorbidities:  Patient Active Problem List   Diagnosis     Elevated LDL cholesterol level     Obesity (BMI 30.0-34.9)     Knee pain     Low back pain     Foot pain     Headache     Perimenopausal     Fatigue     Arthritis     Menstrual disorder     Urinary incontinence     Skin tags, multiple acquired     Vitamin B12 deficiency     Elevated ferritin level     Elevated C-reactive protein (CRP)     FHx: rheumatoid arthritis     Interim: 12# down from initial visit. Has met with the dietitian once (time between d/t Sean being sick).  Label reading skills are most interesting. Exercise: has been coaching-walks the dog 3X/d.  Noticed an improvement with B-12.    Plan: Continue B-12 indefinitely. CRP and LDL at 6 mo after first draw. Add strength training.  -We reviewed her medications and whether associated with weight gain.    We discussed [facility] Bariat[facility] Basics including:  -eating 3 meals daily  -eating protein first  -eating slowly, chewing food well  -avoiding/limiting calorie containing beverages  -choosing wheat, not white with breads, crackers, pastas, roni, bagels, tortillas, rice  -limiting restaurant or cafeteria eating to twice a week or less  -We discussed the importance of restorative sleep and stress " management in maintaining a healthy weight.  -We discussed insulin resistance and glycemic index as it relates to appetite and weight control  -We discussed the National Weight Control Registry healthy weight maintenance strategies and ways to optimize metabolism.  -We discussed the importance of physical activity including cardiovascular and strength training in maintaining a healthier weight and explored viable options.    Most recent labs:  Lab Results   Component Value Date    WBC 8.3 07/31/2017    HGB 13.4 07/31/2017    HCT 39.8 07/31/2017    MCV 89 07/31/2017     07/31/2017       Lab Results   Component Value Date    HDL 59 07/31/2017       Lab Results   Component Value Date    ALT 16 07/31/2017    AST 21 07/31/2017    ALKPHOS 56 07/31/2017    BILITOT 0.5 07/31/2017     Lab Results   Component Value Date    HGBA1C 5.1 07/31/2017     Lab Results   Component Value Date    JYWIDCCF78 174 (L) 07/31/2017     No results found for: [id number]  Lab Results   Component Value Date    FERRITIN 245 (H) 07/31/2017     Lab Results   Component Value Date    PTH 41 07/31/2017       Lab Results   Component Value Date    TSH 1.06 07/31/2017     DIETARY HISTORY  Meals Per Day: 3  Eating Protein First?: yes  Food Diary: B:greek yogurt with kashi S: cheese stick L:salad/chile D:veggie and meat  Snacks Per Day: 1-2  Typical Snack: yogurt or string cheese  Fluid Intake: intentional  Portion Control: improved  Calorie Containing Beverages: cutting down on Starbucks. Skim Caramel macciado  Alcohol per week: 0-2 wine  Typical Protein Food Choices: see above  Choosing Whole Grains: yes  Grocery Shopping is done by: herself  Food Preparation is done by: herself  Meals at Restaurant/Cafeteria/Take Out Per Week: cutting back-goal 2X/wk  Eating at the Table: yes  TV is Off During Meals: yes    Positive Changes Since Last Visit: 12# down, less Starbucks, reading labels  Struggling With: eating out less    Knowledgeable in Reading  "Food Labels: yes  Getting Adequate Protein: yes  Sleeping 7-8 hours/day waking up in the middle of the night 1:30am  Stress management OK    PHYSICAL ACTIVITY PATTERNS:  Cardiovascular: walking and finished soccer season  Strength Training: not yet. Has plan in place    REVIEW OF SYSTEMS  GENERAL/CONSTITUTIONAL:  Fatigue: no  CARDIOVASCULAR:  Chest Pain with Exertion: no  PULMONARY:  Dyspnea on exertion: no  CPAP Use: na  Tobacco Use: no  Asthma Controlled: no  GASTROINTESTINAL:  GERD/Heartburn: no  Gallbladder:   UROLOGIC:  Urinary Symptoms: no  NEUROLOGIC:  Headaches: no  Paresthesias: no  PSYCHIATRIC:  Moods: OK  MUSCULOSKELETAL/RHEUMATOLOGIC  Arthralgias: improved  Myalgias: improved  ENDOCRINE:  Monitoring Blood Sugars: no  Sugars Well Controlled: yes  DERMATOLOGIC:  Rashes: no    PHYSICAL EXAM:  Vitals: /66  Pulse 84  Resp 16  Ht 5' 3\" (1.6 m)  Wt 178 lb 8 oz (81 kg)  BMI 31.62 kg/m2  Height: 5' 3\" (1.6 m) (10/24/2017  8:02 AM)  Initial Weight: 190.5 lbs (10/24/2017  8:02 AM)  Weight: 178 lb 8 oz (81 kg) (10/24/2017  8:02 AM)  Weight loss from initial: 12 (10/24/2017  8:02 AM)  % Weight loss: 6.3 % (10/24/2017  8:02 AM)  BMI (Calculated): 31.6 (10/24/2017  8:02 AM)  SpO2: 100 % (7/31/2017  8:59 AM)  Waist Circumference (In): 41.5 Inches (7/31/2017  8:59 AM)  Hip Circumference (In): 46.5 Inches (7/31/2017  8:59 AM)  Neck Circumference (In): 14 Inches (7/31/2017  8:59 AM)  Body fat percentage: 42.4 (10/24/2017  8:02 AM)    GEN: Pleasant, well groomed, in no acute disress  EYES: EOMI,  ENT: airway patent  NECK: no carotid bruits, no anterior/supraclavicular lymphadenopathy, thyroid normal   HEART: Rhythm regular, rate regular, no murmur   LUNGS: Clear  ABDOMEN: soft, non-tender, obese, no rashes   VASCULAR: no  lower extremity edema  MUSCULOSKELETAL:  muscle mass WNL  SKIN:  no color changes of venous stasis, no ulcerations    Time spent with patients 30 minutes, >50% in counseling and coordination of " care.

## 2021-06-14 NOTE — PROGRESS NOTES
Non-surgical Weight Loss Follow Up Diet Evaluation    Assessment:  This patient is a 47 y.o. female is being seen today for follow-up non-surgical nutritional evaluation. Today we reviewed the patients current eating habits and level of physical activity, and instructed on the changes that are required for successful weight loss outcomes.    Phentermine: 1 tab/day   Vitamins/Mineral Supplementation: B12  +Pt wants to lose another 20lbs     Pt's Initial Weight: 190.5 lbs  Weight: 172 lb 8 oz (78.2 kg)  Weight loss from initial: 18  % Weight loss: 9.45 %  BMI: Body mass index is 30.56 kg/(m^2).  IBW: 115 lbs    Estimated RMR ([place]-St Jeor equation): 1462 calories  Protein requirements (.5grams to .9grams per pound IBW, 20-30% of calories, minimum of 60-80gm per day):   grams    Progress made since last visit: Pt is doing a GREAT job maintaining goals; she is practicing great skills of balance and included protein even while going out. Pt reports feeling more energy and does not crave or snack the same. She is getting in more water and eating healthier snacks throughout the day.  Concerns: Pt could use more water and more exercise    -trying food at work then spitting out  Diet Recall/Time: wakes at 515am  Breakfast: coffee then bfast at work - greek yogurt and kashi cereal (20g)   Am Snack: none  Lunch: chili (23g) OR soup (bean or lentil) OR salad w/ protein (20g)   Pm snack: cheese stick (7g)   Dinner: 1/2 cup cottage cheese w/ (35g) meat and fruit   HS Snack: occasional fruit or string cheese (0-7g)     Protein: 80-90g    Typical Snacks: above   Meals per week away from home: 2-3X/week lunches but making better choices     Recommended limiting eating out to no more than 2x/week.  Patient and I reviewed the importance of eating three consistent meals per day; as well as meal timing to be spaced 4-5 hours apart.  Snack choices: 100-150 calories (1-2x/day if physically hungry), incorporating a fruit/vegetable  w/ protein source.    Meal Duration: 20 minutes    Portion Sizes problematic? NO per patient/diet recall  Encouraged slowing meal times down, 20-30 minutes, chewing to applesauce consistency.   To aid in proper portion control and slow meal time down discussed consuming meals off smaller plates, use toddler/children utensils and set utensils down after each bite.    Protein, vegetables/fruits, carbohydrates:   The patient and I discussed the importance of including lean/low fat protein at each meal and limiting carbohydrate intake to less than 25% of plate volume.     Beverages (Type/Oz. per day)  Water: 55oz  Coffee: 1-2 cups/day   Tea: none  Milk: none  Regular soda: none  Diet soda: occasional   Juice: none  Vivek-Aid/lemonade/etc: none  Alcohol: 2glasses/week     Discussed the importance of adequate hydration and the goal of 64+ oz of fluid daily.   The patient understands the importance of  avoiding all sweetened and alcoholic drinks, and instead choosing 64 oz plain water.    Exercise  Pt signed up for [place] [facility] in [place]  Walking dog   Tried to start strength training     Pt's understands that 45-60 minutes of daily activity is an important part of weight loss success.   Encouraged pt to incorporate  strength training exercise in addition to cardiovascular exercise most days of the week.    PES statement:     1.   (NC-3.3.5) Obese, class III, BMI ?40 related to physical inactivity as evidenced by Infrequent, low-duration and or low intensity physical activity; and Large amounts of sedentary activities; no structured physical activity regimen    Intervention:  Discussion:  1. Educated pt on Eat Better, Move More, Live Well: Non-surgical Weight Loss Handout  2. Reviewed lean protein sources.  20-25gm protein at 3 meals daily.  grams daily total.  3. Educated pt on food labels: keeping total fat grams <10, total sugar grams <10, fiber >3gm per serving.  4. Plate Method: The patient and I discussed the  importance of including lean/low  fat protein at each meal and limiting carbohydrate intake to less  than 25% of plate volume.  +Strength training benefits and options  Instructions/Goals:   1. Include 20-25gm protein at each meal.  2. Increase vegetable/fruit intake, by having a vegetable or fruit with each meal daily. Recommended pt to increase vegetable/fruit intake to 4-5 servings daily.  3. Increase fluid intake to 64oz daily: choose plain or calorie/alcohol-free beverages.  4. Incorporate daily structured activity, 45-60 minutes most days of the week  5. Read food labels more consistently: keeping total fat grams <10, total sugar grams <10, fiber >3gm per serving. Fill out food journal and bring to next month's visit.  6. Practice plate method: 1/2 plate lean/low fat protein source, vegetable/fruit, <25% of plate complex carbohydrates.  7. Carbohydrates from grain sources at meal times to be no more than 1 Carb Choice, ie: 15-20 gm total carbohydrate per serving  8. Practice eating off of smaller plates/bowls, chewing to applesauce consistency, taking 20-30 minutes to eat in a calm/relaxed environment without distractions of tv/email/cell phone.    Handouts Provided:  Plate Method    Monitor/Evaluation:    Pt will f/u in one month with bariatrician, and f/u in two months with RD.    Plan for next visit with RD:  GOALS:  1) continue to practice skills learned  2) Strength training in the AM (plank/push-ups)/ use bands 2-3X/week   3) buy resistance bands     Time In: 8:00a  Time Out: 8:30a    ABN signed: Yes

## 2021-06-15 NOTE — PROGRESS NOTES
"Bariatric Follow-up    47 y.o.  female BMI:Body mass index is 30.65 kg/(m^2).    Weight:   Wt Readings from Last 1 Encounters:   01/10/18 173 lb (78.5 kg)    pounds  Height: 5' 3\" (1.6 m) (1/10/2018  8:55 AM)  Initial Weight: 190.5 lbs (11/27/2017  7:00 AM)  Weight: 173 lb (78.5 kg) (1/10/2018  8:55 AM)  Weight loss from initial: 18 (11/27/2017  7:00 AM)  % Weight loss: 9.45 % (11/27/2017  7:00 AM)  BMI (Calculated): 30.7 (1/10/2018  8:55 AM)  SpO2: 100 % (1/10/2018  8:55 AM)  Waist Circumference (In): 41.5 Inches (7/31/2017  8:59 AM)  Hip Circumference (In): 46.5 Inches (7/31/2017  8:59 AM)  Neck Circumference (In): 14 Inches (7/31/2017  8:59 AM)  Body fat percentage: 42.4 (10/24/2017  8:02 AM)    Comorbidities:  Patient Active Problem List   Diagnosis     Elevated LDL cholesterol level     Obesity (BMI 30.0-34.9)     Knee pain     Low back pain     Foot pain     Headache     Perimenopausal     Fatigue     Arthritis     Menstrual disorder     Urinary incontinence     Skin tags, multiple acquired     Vitamin B12 deficiency     Elevated ferritin level     Elevated C-reactive protein (CRP)     FHx: rheumatoid arthritis     Interim: Gunjan maintains almost 20# weight loss. Her LDL is coming down. Her BP is excellent. Her blood sugars are normal.     Plan: Refill phentermine attempt to get lipids. CRP will get price for recheck.  -We reviewed her medications and whether associated with weight gain.    We discussed [facility] Bariatric Basics including:  -eating 3 meals daily  -eating protein first  -eating slowly, chewing food well  -avoiding/limiting calorie containing beverages  -choosing wheat, not white with breads, crackers, pastas, roni, bagels, tortillas, rice  -limiting restaurant or cafeteria eating to twice a week or less  -We discussed the importance of restorative sleep and stress management in maintaining a healthy weight.  -We discussed insulin resistance and glycemic index as it relates to appetite and " weight control  -We discussed the National Weight Control Registry healthy weight maintenance strategies and ways to optimize metabolism.  -We discussed the importance of physical activity including cardiovascular and strength training in maintaining a healthier weight and explored viable options.    Most recent labs:    Gunjan provided her Lipids recently drawn at [facility] in [place]:    Triglycerides 104 mg/dL 0-149 mg/dL   HDL Cholesterol 57 mg/dL >39 mg/dL   VLDL Cholesterol Memo 21 mg/dL 5-40 mg/dL   LDL Cholesterol Calc 155 mg/dL H 0-99 mg/dL   T. Chol/HDL Ratio 4.1 ratio units 0.0-4.4 ratio units DV                             Lab Results   Component Value Date    WBC 8.3 07/31/2017    HGB 13.4 07/31/2017    HCT 39.8 07/31/2017    MCV 89 07/31/2017     07/31/2017       Lab Results   Component Value Date    HDL 59 07/31/2017       Lab Results   Component Value Date    ALT 16 07/31/2017    AST 21 07/31/2017    ALKPHOS 56 07/31/2017    BILITOT 0.5 07/31/2017     Lab Results   Component Value Date    HGBA1C 5.1 07/31/2017     Lab Results   Component Value Date    HEHVIAEL48 174 (L) 07/31/2017     No results found for: [id number]  Lab Results   Component Value Date    FERRITIN 245 (H) 07/31/2017     Lab Results   Component Value Date    PTH 41 07/31/2017     No results found for: 07577  No results found for: 7597  Lab Results   Component Value Date    TSH 1.06 07/31/2017       DIETARY HISTORY  Meals Per Day: 3  Eating Protein First?: yes  Food Diary: B:greek yogurt and kashi  L:salad with protein or soup with beans/lentils D:hamburger 1/2 bun avocado  Snacks Per Day: 0-1  Typical Snack: fruit,berries  Fluid Intake: intentional  Portion Control: improved  Calorie Containing Beverages: none  Alcohol per week: 0-2  Typical Protein Food Choices: see above  Choosing Whole Grains: yes  Grocery Shopping is done by: herself  Food Preparation is done by: herslef  Meals at  "Restaurant/Cafeteria/Take Out Per Week: 2  Eating at the Table: yes  TV is Off During Meals: yes    Positive Changes Since Last Visit: she maintains a 17# weight loss  Struggling With: Holidays are a struggle    Knowledgeable in Reading Food Labels: yes  Getting Adequate Protein: yes  Sleeping 7-8 hours/day 9:30-5am  Stress management OK    PHYSICAL ACTIVITY PATTERNS:  Cardiovascular: dog walking, Bands for strength training Likes them. 10,000 steps, using a monitor  Strength Training: bands.    REVIEW OF SYSTEMS  GENERAL/CONSTITUTIONAL:  Fatigue: yes  CARDIOVASCULAR:  Chest Pain with Exertion: no  PULMONARY:  Dyspnea on exertion: no  CPAP Use: no  Tobacco Use: no  Asthma Controlled: NA  GASTROINTESTINAL:  GERD/Heartburn: no  Gallbladder:   UROLOGIC:  Urinary Symptoms: no  NEUROLOGIC:  Headaches: occ  Paresthesias:   PSYCHIATRIC:  Moods: OK  MUSCULOSKELETAL/RHEUMATOLOGIC  Arthralgias: yes  Myalgias: yes  ENDOCRINE:  Monitoring Blood Sugars: no  Sugars Well Controlled: yes  DERMATOLOGIC:  Rashes: no    PHYSICAL EXAM:  Vitals: /72  Pulse 72  Resp 18  Ht 5' 3\" (1.6 m)  Wt 173 lb (78.5 kg)  SpO2 100%  BMI 30.65 kg/m2  Height: 5' 3\" (1.6 m) (1/10/2018  8:55 AM)  Initial Weight: 190.5 lbs (11/27/2017  7:00 AM)  Weight: 173 lb (78.5 kg) (1/10/2018  8:55 AM)  Weight loss from initial: 18 (11/27/2017  7:00 AM)  % Weight loss: 9.45 % (11/27/2017  7:00 AM)  BMI (Calculated): 30.7 (1/10/2018  8:55 AM)  SpO2: 100 % (1/10/2018  8:55 AM)  Waist Circumference (In): 41.5 Inches (7/31/2017  8:59 AM)  Hip Circumference (In): 46.5 Inches (7/31/2017  8:59 AM)  Neck Circumference (In): 14 Inches (7/31/2017  8:59 AM)  Body fat percentage: 42.4 (10/24/2017  8:02 AM)    GEN: Pleasant, well groomed, in no acute distress  EYES: EOMI,  ENT: airway patent  NECK: no carotid bruits, no anterior/supraclavicular lymphadenopathy, thyroid normal   HEART: Rhythm regular, rate regular, no murmur   LUNGS: Clear  ABDOMEN: soft, non-tender, " obese, no rashes   VASCULAR: no  lower extremity edema  MUSCULOSKELETAL:  muscle mass OK for age  SKIN:  no color changes of venous stasis, no ulcerations    Time spent with patients 30 minutes, >50% in counseling and coordination of care.

## 2021-06-16 PROBLEM — R79.89 ELEVATED FERRITIN LEVEL: Status: ACTIVE | Noted: 2017-07-31

## 2021-06-16 PROBLEM — E53.8 VITAMIN B12 DEFICIENCY: Status: ACTIVE | Noted: 2017-07-31

## 2021-06-16 NOTE — PROGRESS NOTES
Non-surgical Weight Loss Follow Up Diet Evaluation     Assessment:  This patient is a 47 y.o. female is being seen today for follow-up non-surgical nutritional evaluation. Today we reviewed the patients current eating habits and level of physical activity, and instructed on the changes that are required for successful weight loss outcomes.     -today's appt was spent looking at balance of nutrition throughout the day - CHO and fat amounts and discussing mental hunger    Phentermine: 1 tab/day   Vitamins/Mineral Supplementation: B12 and Vitamin D  +Pt wants to lose another 20lbs      Pt's Initial Weight: 190.5 lbs  Weight: 172 lb 8 oz (78.2 kg)  Weight loss from initial: 18  % Weight loss: 9.45 %  BMI: Body mass index is 30.56 kg/(m^2).  IBW: 115 lbs     Estimated RMR ([place]-St Jeor equation): 1462 calories  Protein requirements (.5grams to .9grams per pound IBW, 20-30% of calories, minimum of 60-80gm per day):   grams     Progress made since last visit: Pt is doing a GREAT job maintaining goals; she is continuing to focus on protein at each meal. Pt is getting even more water in and created great new habits for the family including using calm asha at night with her daughter (at apt today) -daughter also has an activity tracker  Concerns: Pt is snacking throughout the day when not hungry - will focus      -trying food at work then spitting out  Diet Recall/Time: wakes at 515am  Breakfast: coffee then bfast at work - greek yogurt and 1 cup kashi cereal OR fruit (sometimes 1 TBS honey) (20g) - discussed  Am Snack: lunch meat OR string cheese (8g) OR fruit   Lunch: chili (23g) OR soup (bean or lentil) OR salad w/ protein (20g)   Pm snack: occaisonal 1 TBS PB (4g) OR nuts   Dinner: Pro/Veg/CHO   HS Snack: occasional cookie OR small amount of ice cream   +craving sugar discussed    Protein: 80-90g     Typical Snacks: above   Meals per week away from home: 2-3X/week lunches but making better choices       Recommended limiting eating out to no more than 2x/week.  Patient and I reviewed the importance of eating three consistent meals per day; as well as meal timing to be spaced 4-5 hours apart.  Snack choices: 100-150 calories (1-2x/day if physically hungry), incorporating a fruit/vegetable w/ protein source.     Meal Duration: 20 minutes     Portion Sizes problematic? NO per patient/diet recall  Encouraged slowing meal times down, 20-30 minutes, chewing to applesauce consistency.   To aid in proper portion control and slow meal time down discussed consuming meals off smaller plates, use toddler/children utensils and set utensils down after each bite.     Protein, vegetables/fruits, carbohydrates:   The patient and I discussed the importance of including lean/low fat protein at each meal and limiting carbohydrate intake to less than 25% of plate volume.      Beverages (Type/Oz. per day)  Water: 32oz (24oz la croix) = around 60oz  Coffee: 1-2 cups/day   Tea: none  Milk: none  Regular soda: none  Diet soda: occasional   Juice: none  Vivek-Aid/lemonade/etc: none  Alcohol: 2glasses/week      Discussed the importance of adequate hydration and the goal of 64+ oz of fluid daily.   The patient understands the importance of  avoiding all sweetened and alcoholic drinks, and instead choosing 64 oz plain water.     Exercise  Pt signed up for [place] [employer] in [place]  Walking dog   Trying to start strength training      Pt's understands that 45-60 minutes of daily activity is an important part of weight loss success.   Encouraged pt to incorporate  strength training exercise in addition to cardiovascular exercise most days of the week.     PES statement:     1.   (NC-3.3.5) Obese, class III, BMI ?40 related to physical inactivity as evidenced by Infrequent, low-duration and or low intensity physical activity; and Large amounts of sedentary activities; no structured physical activity regimen      Intervention:  Discussion:  1. Educated pt on Eat Better, Move More, Live Well: Non-surgical Weight Loss Handout  2. Reviewed lean protein sources.  20-25gm protein at 3 meals daily.  grams daily total.  3. Educated pt on food labels: keeping total fat grams <10, total sugar grams <10, fiber >3gm per serving.  4. Plate Method: The patient and I discussed the importance of including lean/low  fat protein at each meal and limiting carbohydrate intake to less  than 25% of plate volume.  +Strength training benefits and options  Instructions/Goals:   1. Include 20-25gm protein at each meal.  2. Increase vegetable/fruit intake, by having a vegetable or fruit with each meal daily. Recommended pt to increase vegetable/fruit intake to 4-5 servings daily.  3. Increase fluid intake to 64oz daily: choose plain or calorie/alcohol-free beverages.  4. Incorporate daily structured activity, 45-60 minutes most days of the week  5. Read food labels more consistently: keeping total fat grams <10, total sugar grams <10, fiber >3gm per serving. Fill out food journal and bring to next month's visit.  6. Practice plate method: 1/2 plate lean/low fat protein source, vegetable/fruit, <25% of plate complex carbohydrates.  7. Carbohydrates from grain sources at meal times to be no more than 1 Carb Choice, ie: 15-20 gm total carbohydrate per serving  8. Practice eating off of smaller plates/bowls, chewing to applesauce consistency, taking 20-30 minutes to eat in a calm/relaxed environment without distractions of tv/email/cell phone.     Handouts Provided:  50 Things to do Instead of Snack  Plate - wrote out timing and some options  Bfast Ideas  Protein Supplement Handout - Sample     Monitor/Evaluation:     Plan for next visit with RD:  GOALS:  1) Change up bfast-variery (20-25g of protein)  2) Strength training in the AM (plank/push-ups)/ use bands 2-3X/week  3) Would like to work on only snacking when hungry         Time In:  8:00a  Time Out: 8:30a

## 2021-06-20 NOTE — PROGRESS NOTES
"Bariatric Follow-up    48 y.o.  female BMI:Body mass index is 31.71 kg/(m^2).    Weight:   Wt Readings from Last 1 Encounters:   10/11/18 179 lb (81.2 kg)    pounds  Height: 5' 3\" (1.6 m) (10/11/2018  8:00 AM)  Initial Weight: 190.5 lbs (3/5/2018  8:00 AM)  Weight: 179 lb (81.2 kg) (10/11/2018  8:00 AM)  Weight loss from initial: 18.5 (3/5/2018  8:00 AM)  % Weight loss: 9.71 % (3/5/2018  8:00 AM)  BMI (Calculated): 31.7 (10/11/2018  8:00 AM)  SpO2: 100 % (10/11/2018  8:00 AM)  Body fat percentage: 42.4 (10/24/2017  8:02 AM)    Comorbidities:  Patient Active Problem List   Diagnosis     Elevated LDL cholesterol level     Obesity (BMI 30.0-34.9)     Knee pain     Low back pain     Foot pain     Headache     Perimenopausal     Fatigue     Arthritis     Menstrual disorder     Urinary incontinence     Skin tags, multiple acquired     Vitamin B12 deficiency     Elevated ferritin level     Elevated C-reactive protein (CRP)     FHx: rheumatoid arthritis       Interim: She maintains a 11# weight loss. Doing well.     Plan: add strength training, f/u labs and f/u 6 months or prn.  -We reviewed her medications and whether associated with weight gain.    We discussed [facility] Bariatric Basics including:  -eating 3 meals daily  -eating protein first  -eating slowly, chewing food well  -avoiding/limiting calorie containing beverages  -choosing wheat, not white with breads, crackers, pastas, roni, bagels, tortillas, rice  -limiting restaurant or cafeteria eating to twice a week or less  -We discussed the importance of restorative sleep and stress management in maintaining a healthy weight.  -We discussed insulin resistance and glycemic index as it relates to appetite and weight control  -We discussed the National Weight Control Registry healthy weight maintenance strategies and ways to optimize metabolism.  -We discussed the importance of physical activity including cardiovascular and strength training in maintaining a healthier " weight and explored viable options.    Most recent labs:  Lab Results   Component Value Date    WBC 8.3 07/31/2017    HGB 13.4 07/31/2017    HCT 39.8 07/31/2017    MCV 89 07/31/2017     07/31/2017     No results found for: CHOL  Lab Results   Component Value Date    HDL 59 07/31/2017     No results found for: LDLCALC  No results found for: TRIG  No components found for: CHOLHDL  Lab Results   Component Value Date    ALT 16 07/31/2017    AST 21 07/31/2017    ALKPHOS 56 07/31/2017    BILITOT 0.5 07/31/2017     Lab Results   Component Value Date    HGBA1C 5.1 07/31/2017     Lab Results   Component Value Date    GSRFOUQW23 174 (L) 07/31/2017     No results found for: [id number]  Lab Results   Component Value Date    FERRITIN 245 (H) 07/31/2017     Lab Results   Component Value Date    PTH 41 07/31/2017     No results found for: 28746  No results found for: 7597  Lab Results   Component Value Date    TSH 1.06 07/31/2017     No results found for: TESTOSTERONE    DIETARY HISTORY  Meals Per Day: 3  Eating Protein First?: yes  Food Diary: B:eggs, black beans and egg, avocado,  L:salad with protein D:3 meat platter (traveling)  Snacks Per Day: occ  Typical Snack: cheese stick, nuts  Fluid Intake: intentional  Portion Control: improved  Calorie Containing Beverages: none  Alcohol per week: 2  Typical Protein Food Choices: variety  Choosing Whole Grains: yes  Grocery Shopping is done by: she does  Food Preparation is done by: she does  Meals at Restaurant/Cafeteria/Take Out Per Week: varies  Eating at the Table: yes  TV is Off During Meals: yes    Positive Changes Since Last Visit: maintaining healthy weight  Struggling With: time, exercise, balance    Knowledgeable in Reading Food Labels: yes  Getting Adequate Protein: yes  Sleeping 7-8 hours/day 8  Stress management doing well    PHYSICAL ACTIVITY PATTERNS:  Cardiovascular: walks the dog 3X/d,   Strength Training: none.    REVIEW OF  "SYSTEMS  GENERAL/CONSTITUTIONAL:  Fatigue: Ok  CARDIOVASCULAR:  Chest Pain with Exertion: no  PULMONARY:  Dyspnea on exertion: yes  CPAP Use: no  Tobacco Use: no  Asthma Controlled: NA  GASTROINTESTINAL:  GERD/Heartburn: no  Gallbladder:   UROLOGIC:  Urinary Symptoms: no  NEUROLOGIC:  Headaches: rare  Paresthesias: no  PSYCHIATRIC:  Moods: OK  MUSCULOSKELETAL/RHEUMATOLOGIC  Arthralgias: left knee  Myalgias: yes  ENDOCRINE:  Monitoring Blood Sugars: no  Sugars Well Controlled: yes  DERMATOLOGIC:  Rashes: no    PHYSICAL EXAM:  Vitals: /72  Resp 18  Ht 5' 3\" (1.6 m)  Wt 179 lb (81.2 kg)  SpO2 100%  BMI 31.71 kg/m2  Height: 5' 3\" (1.6 m) (10/11/2018  8:00 AM)  Initial Weight: 190.5 lbs (3/5/2018  8:00 AM)  Weight: 179 lb (81.2 kg) (10/11/2018  8:00 AM)  Weight loss from initial: 18.5 (3/5/2018  8:00 AM)  % Weight loss: 9.71 % (3/5/2018  8:00 AM)  BMI (Calculated): 31.7 (10/11/2018  8:00 AM)  SpO2: 100 % (10/11/2018  8:00 AM)  Body fat percentage: 42.4 (10/24/2017  8:02 AM)    GEN: Pleasant, well groomed, in no acute distress  EYES: EOMI,  ENT: airway patent  NECK: no carotid bruits, no anterior/supraclavicular lymphadenopathy, thyroid normal   HEART: Rhythm regular, rate regular, no murmur   LUNGS: Clear  ABDOMEN: soft, non-tender, obese, no rashes   VASCULAR: no  lower extremity edema  MUSCULOSKELETAL:  muscle mass OK for age  SKIN:  no color changes of venous stasis, no ulcerations    Time spent with patients 30 minutes, >50% in counseling and coordination of care.        "

## 2021-06-27 ENCOUNTER — HEALTH MAINTENANCE LETTER (OUTPATIENT)
Age: 51
End: 2021-06-27

## 2021-07-03 NOTE — ADDENDUM NOTE
Addendum Note by José Miguel Chery MLT at 10/12/2018  5:50 PM     Author: José Miguel Chery MLT Service: -- Author Type:     Filed: 10/12/2018  5:50 PM Encounter Date: 10/11/2018 Status: Signed    : José Miguel Chery MLT ()    Addended by: JOSÉ MIGUEL CHERY on: 10/12/2018 05:50 PM        Modules accepted: Orders

## 2021-10-17 ENCOUNTER — HEALTH MAINTENANCE LETTER (OUTPATIENT)
Age: 51
End: 2021-10-17

## 2022-07-24 ENCOUNTER — HEALTH MAINTENANCE LETTER (OUTPATIENT)
Age: 52
End: 2022-07-24

## 2022-10-02 ENCOUNTER — HEALTH MAINTENANCE LETTER (OUTPATIENT)
Age: 52
End: 2022-10-02

## 2023-08-12 ENCOUNTER — HEALTH MAINTENANCE LETTER (OUTPATIENT)
Age: 53
End: 2023-08-12